# Patient Record
Sex: FEMALE | Employment: FULL TIME | URBAN - METROPOLITAN AREA
[De-identification: names, ages, dates, MRNs, and addresses within clinical notes are randomized per-mention and may not be internally consistent; named-entity substitution may affect disease eponyms.]

---

## 2024-06-27 ENCOUNTER — APPOINTMENT (OUTPATIENT)
Dept: PODIATRY | Facility: CLINIC | Age: 74
End: 2024-06-27
Payer: COMMERCIAL

## 2024-06-27 DIAGNOSIS — L84 CORNS AND CALLOSITIES: ICD-10-CM

## 2024-06-27 DIAGNOSIS — B35.1 ONYCHOMYCOSIS: ICD-10-CM

## 2024-06-27 DIAGNOSIS — M79.671 PAIN IN BOTH FEET: ICD-10-CM

## 2024-06-27 DIAGNOSIS — M20.5X1 ACQUIRED ADDUCTOVARUS ROTATION OF TOE OF RIGHT FOOT: ICD-10-CM

## 2024-06-27 DIAGNOSIS — M20.5X1 HALLUX LIMITUS OF RIGHT FOOT: ICD-10-CM

## 2024-06-27 DIAGNOSIS — M20.5X2 HALLUX LIMITUS OF LEFT FOOT: ICD-10-CM

## 2024-06-27 DIAGNOSIS — M79.672 PAIN IN BOTH FEET: ICD-10-CM

## 2024-06-27 DIAGNOSIS — L60.3 DYSTROPHIC NAIL: Primary | ICD-10-CM

## 2024-06-27 PROCEDURE — 1036F TOBACCO NON-USER: CPT | Performed by: PODIATRIST

## 2024-06-27 PROCEDURE — 99213 OFFICE O/P EST LOW 20 MIN: CPT | Performed by: PODIATRIST

## 2024-06-27 PROCEDURE — 1160F RVW MEDS BY RX/DR IN RCRD: CPT | Performed by: PODIATRIST

## 2024-06-27 PROCEDURE — 11750 EXCISION NAIL&NAIL MATRIX: CPT | Performed by: PODIATRIST

## 2024-06-27 PROCEDURE — 1159F MED LIST DOCD IN RCRD: CPT | Performed by: PODIATRIST

## 2024-06-27 NOTE — PROGRESS NOTES
Chief Complaint   Patient presents with    nail fungus     Patient is here today with nail fungus big toes THIAGO. Dr. Valentine saw patient is 2021 and removed both nails and grew back with fungus in them.       HPI: C/O fungal nails.  Had temporary total nail avulsions hallux B/L in 2021, nails grew back thick and painful.  Patient did take 90 days of oral Lamisil at that time.  No improvement with fungus with the oral treatment.  H/O trauma to the hallux secondary to steel door.  Patient is also complaining of thickness to the second digit nails which she is unable to debride herself.  Patient is requesting a permanent nail avulsions of her hallux nails bilaterally.  The hallux nails cause constant pain in her shoe gear and with weightbearing activities.  Patient is also complaining painful callus right foot.    PMH, PSx, Medications and allergies reviewed.  ROS negative except for what is stated in HPI.    Physical Exam  Patient alert, oriented, no acute distress    VASC: +2/4 pedal pulses B/L.  CFT brisk all digits.  Skin temperature is warm to warm proximal distal B/L.  (-)hair growth B/L.   No edema noted B/L    NEURO: Vibratory intact B/L.  Light touch intact B/L.     DERM:Nails 1-2 bilateral are thickened, discolored, crumbly, painful and elongated with subungual debris. Pain on palpation to the nails. No cellulitis noted.  No acute paronychia is noted B/L.  Hallux nails are extremely dystrophic.  Preulcerative hyperkeratotic painful lesion on the dorsal aspect of the fifth right digit 0.3 cm in size.    MUSCULOSKEL: +5/5 muscle strength B/L.    Decreased 1st MPJ and ankle joint ROM B/L.  Adductovarus fifth digits B/L    Nail Removal    Date/Time: 6/27/2024 2:01 PM    Performed by: Kati Pepe DPM  Authorized by: Kati Pepe DPM    Consent:     Consent obtained:  Verbal    Consent given by:  Patient    Risks, benefits, and alternatives were discussed: yes    Universal protocol:     Procedure  explained and questions answered to patient or proxy's satisfaction: yes    Pre-procedure details:     Skin preparation:  Povidone-iodine  Procedure details:     Location:  Foot    Foot location:  L big toe  Anesthesia:     Anesthesia method:  Local infiltration    Local anesthetic:  Lidocaine 2% w/o epi  Nail Removal:     Nail removed:  Complete  Ingrown nail:     Nail matrix removed or ablated:  Complete  Post-procedure details:     Dressing:  4x4 sterile gauze and antibiotic ointment    Procedure completion:  Tolerated well, no immediate complications  Comments:      3 cc of 2% lidocaine plain for anesthesia.  This was a total nail avulsion with a NAOH matrixectomy.     Assessment and Plan  #1 Onychomycosis/dystrophic toenail  Discussed findings and treatment options with patient  Failed p.o. Lamisil  Patient requests permanent removal of the thick dystrophic mycotic hallux toenails  Total nail avulsion left hallux with matrixectomy performed today.  Local wound care instructions and foot soaks dispensed  Debrided second fungal toenails for patient without complications.  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

## 2024-07-11 ENCOUNTER — APPOINTMENT (OUTPATIENT)
Dept: PODIATRY | Facility: CLINIC | Age: 74
End: 2024-07-11
Payer: COMMERCIAL

## 2024-07-11 DIAGNOSIS — L60.3 DYSTROPHIC NAIL: Primary | ICD-10-CM

## 2024-07-11 PROCEDURE — 1160F RVW MEDS BY RX/DR IN RCRD: CPT | Performed by: PODIATRIST

## 2024-07-11 PROCEDURE — 11750 EXCISION NAIL&NAIL MATRIX: CPT | Performed by: PODIATRIST

## 2024-07-11 PROCEDURE — 1036F TOBACCO NON-USER: CPT | Performed by: PODIATRIST

## 2024-07-11 PROCEDURE — 1159F MED LIST DOCD IN RCRD: CPT | Performed by: PODIATRIST

## 2024-07-11 NOTE — LETTER
"July 11, 2024     Patient: Adelaida Juarez   YOB: 1950   Date of Visit: 7/11/2024       To Whom It May Concern:    Adelaida Juarez was seen in my clinic on 7/11/2024 and 6/27/24 for IGTN surgery Bilateral hallux.  Please allow her to wear her \" croc-type\" shoes while she is healing and limit her walking and standing during work. Please allow several breaks as needed.    If you have any questions or concerns, please don't hesitate to call.         Sincerely,         Kati Pepe DPM        CC: No Recipients  "

## 2024-07-11 NOTE — PROGRESS NOTES
Chief Complaint   Patient presents with    nail removal     Patient is here today for right hallux nail removal and a follow up on left hallux      HPI: C/O fungal/dystrophic right hallux nail, requests surgical procedure.    S/P NaOH matrixectomy and total nail avulsion left hallux.  Patient is soaking the foot in applying Neosporin and a bandage daily to the left hallux.  No pain on the left hallux.    Physical Exam  Patient alert, oriented, no acute distress    VASC: +2/4 pedal pulses B/L.  CFT brisk all digits.  Skin temperature is warm to warm proximal distal B/L.  (-)hair growth B/L.   No edema noted B/L    NEURO: Vibratory intact B/L.  Light touch intact B/L.     DERM:Nails 1-2 bilateral are thickened, discolored, crumbly, painful and elongated with subungual debris. Pain on palpation to the nails. No cellulitis noted.  No acute paronychia is noted B/L.  Right hallux nail is extremely dystrophic.  Avulsed toenail noted on the left hallux.  There is a small shallow superficial ulceration in the nailbed of the left hallux limited to breakdown.  No signs of cellulitis noted bilateral foot.    MUSCULOSKEL: +5/5 muscle strength B/L.    Decreased 1st MPJ and ankle joint ROM B/L.  Adductovarus fifth digits B/L    Nail Removal    Date/Time: 7/11/2024 12:48 PM    Performed by: Kati Pepe DPM  Authorized by: Kati Pepe DPM    Consent:     Consent obtained:  Verbal    Consent given by:  Patient    Risks discussed:  Bleeding, incomplete removal, infection and pain  Universal protocol:     Procedure explained and questions answered to patient or proxy's satisfaction: yes    Pre-procedure details:     Skin preparation:  Povidone-iodine  Procedure details:     Location:  Foot    Foot location:  R big toe  Anesthesia:     Anesthesia method:  Local infiltration    Local anesthetic:  Lidocaine 2% w/o epi  Nail Removal:     Nail removed:  Complete  Ingrown nail:     Nail matrix removed or ablated:   Complete  Post-procedure details:     Dressing:  4x4 sterile gauze and antibiotic ointment    Procedure completion:  Tolerated well, no immediate complications  Comments:      Total nail avulsion right hallux with NaOH matrixectomy.  9 cc of 2% lidocaine plain used for anesthesia.      Assessment and Plan  #1 Onychomycosis/dystrophic toenail  Total nail avulsion right hallux with NaOH matrixectomy performed  Wound care instructions dispensed  Left toenail surgical site is progressing well  Patient can stop foot soaks on the left side but continue with the antibiotic ointment and Band-Aid until wound resolves  Follow-up 2 weeks if any issues at surgical sites